# Patient Record
Sex: FEMALE | Race: WHITE | NOT HISPANIC OR LATINO | URBAN - METROPOLITAN AREA
[De-identification: names, ages, dates, MRNs, and addresses within clinical notes are randomized per-mention and may not be internally consistent; named-entity substitution may affect disease eponyms.]

---

## 2019-01-16 ENCOUNTER — OUTPATIENT (OUTPATIENT)
Dept: OUTPATIENT SERVICES | Facility: HOSPITAL | Age: 64
LOS: 1 days | End: 2019-01-16
Payer: COMMERCIAL

## 2019-01-16 ENCOUNTER — APPOINTMENT (OUTPATIENT)
Dept: MRI IMAGING | Facility: CLINIC | Age: 64
End: 2019-01-16
Payer: COMMERCIAL

## 2019-01-16 DIAGNOSIS — Z00.8 ENCOUNTER FOR OTHER GENERAL EXAMINATION: ICD-10-CM

## 2019-01-16 PROCEDURE — C8908: CPT

## 2019-01-16 PROCEDURE — 77049 MRI BREAST C-+ W/CAD BI: CPT | Mod: 26

## 2019-01-16 PROCEDURE — C8937: CPT

## 2021-05-07 ENCOUNTER — RESULT REVIEW (OUTPATIENT)
Age: 66
End: 2021-05-07

## 2022-10-14 ENCOUNTER — APPOINTMENT (OUTPATIENT)
Dept: PLASTIC SURGERY | Facility: CLINIC | Age: 67
End: 2022-10-14

## 2022-10-14 VITALS
SYSTOLIC BLOOD PRESSURE: 159 MMHG | HEIGHT: 60 IN | DIASTOLIC BLOOD PRESSURE: 84 MMHG | BODY MASS INDEX: 21.79 KG/M2 | HEART RATE: 54 BPM | WEIGHT: 111 LBS | TEMPERATURE: 98 F | OXYGEN SATURATION: 99 %

## 2022-10-14 PROCEDURE — 99213 OFFICE O/P EST LOW 20 MIN: CPT

## 2022-10-14 NOTE — REVIEW OF SYSTEMS
[Negative] : Psychiatric [de-identified] : hypothyroidism [de-identified] : hx of right breast cancer

## 2022-10-14 NOTE — HISTORY OF PRESENT ILLNESS
[FreeTextEntry1] : Patient is a 67 year old female here today for a routine follow up visit. \par She has a history of right breast cancer in 2015; she is s/p bilateral mastectomies, bilateral axillary sentinel lymph node biopsies with implant reconstruction. \par Pathology: Stage Ia ER positive/[place] positive/Hhf5wwj negative. \par Plastic Surgeon: Dr. Schmidt, to be seen on an as needed basis\par Med Onc: she saw Dr. Agustin Wing in the past; she now sees Dr. Osorio; she is on Letrozole, which she started in 2015. Her next appointment with Dr. Osorio is 12/2022. \par 1/28/2022 Bilateral breast MRI: s/p bilateral mastectomies with silicone implant reconstruction. Intact bilateral breast implants. No suspicious enhancing masses or areas of ductal enhancement. No evidence of malignancy. Bi-rads 2.\par She came alone.  \par Patient notes she experienced soreness and tenderness of her left axilla 2 weeks ago after receiving her most recent COVID booster. This symptom has since resolved.\par She denies any breast concerns on self breast examinations.

## 2022-10-14 NOTE — PHYSICAL EXAM
[Normocephalic] : normocephalic [Atraumatic] : atraumatic [Supple] : supple [No Supraclavicular Adenopathy] : no supraclavicular adenopathy [Examined in the supine and seated position] : examined in the supine and seated position [No dominant masses] : no dominant masses in right breast  [No dominant masses] : no dominant masses left breast [No Nipple Retraction] : no left nipple retraction [No Nipple Discharge] : no left nipple discharge [No Axillary Lymphadenopathy] : no left axillary lymphadenopathy [No Edema] : no edema [No Rashes] : no rashes [No Ulceration] : no ulceration [de-identified] : s/p bilateral mastectomies with implant reconstruction and nipple areolar reconstruction.

## 2022-10-14 NOTE — CONSULT LETTER
[Dear  ___] : Dear  [unfilled], [Consult Letter:] : I had the pleasure of evaluating your patient, [unfilled]. [Please see my note below.] : Please see my note below. [Sincerely,] : Sincerely, [FreeTextEntry3] : Susan M. Palleschi, MD, FACS\par Division of Breast Surgery\par Director, Breast Surgery\par [facility]\par [address] [place] [address]\par [address]\[place] [address]\par (Phone) [phone]\par (Fax) [phone]  [DrAntoinette  ___] : Dr. BARKLEY

## 2022-10-14 NOTE — ASSESSMENT
[FreeTextEntry1] : history of right breast cancer in 2015; she is s/p bilateral mastectomies, bilateral axillary sentinel lymph node biopsies with implant reconstruction. \par Clinical breast examination reveals no evidence of disease recurrence.\par \par 1. Screening breast MRI (implant evaluation) advised q 2-3 years, due 1/2024 (q 2 years per patient preference).\par 2. Follow up office visit due 4/2023. \par 3. Advised monthly self breast examinations and advised her to contact me if she has any concerns. \par 4. Follow up med onc Dr. Osorio.

## 2024-01-02 NOTE — HISTORY OF PRESENT ILLNESS
[FreeTextEntry1] : Patient is a 68 year old female here today for a routine follow up visit.  She has a history of right breast cancer in 2015; she is s/p bilateral mastectomies, bilateral axillary sentinel lymph node biopsies with implant reconstruction.  Pathology: Stage Ia ER positive/[place] positive/Gmz5dqa negative.  Plastic Surgeon: Dr. Schmidt, to be seen on an as needed basis  Med Onc: she saw Dr. Agustin Wing in the past; she now sees Dr. Osorio; she is on Letrozole, which she started in 2015. Her next appointment with Dr. Osorio is 12/2022.  1/28/2022 Bilateral breast MRI: s/p bilateral mastectomies with silicone implant reconstruction. Intact bilateral breast implants. No suspicious enhancing masses or areas of ductal enhancement. No evidence of malignancy. BI-RADS 2.

## 2024-01-23 ENCOUNTER — NON-APPOINTMENT (OUTPATIENT)
Age: 69
End: 2024-01-23

## 2024-01-24 ENCOUNTER — APPOINTMENT (OUTPATIENT)
Dept: ULTRASOUND IMAGING | Facility: CLINIC | Age: 69
End: 2024-01-24
Payer: COMMERCIAL

## 2024-01-24 ENCOUNTER — OUTPATIENT (OUTPATIENT)
Dept: OUTPATIENT SERVICES | Facility: HOSPITAL | Age: 69
LOS: 1 days | End: 2024-01-24
Payer: COMMERCIAL

## 2024-01-24 ENCOUNTER — RESULT REVIEW (OUTPATIENT)
Age: 69
End: 2024-01-24

## 2024-01-24 DIAGNOSIS — Z00.8 ENCOUNTER FOR OTHER GENERAL EXAMINATION: ICD-10-CM

## 2024-01-24 PROCEDURE — 76641 ULTRASOUND BREAST COMPLETE: CPT | Mod: 26,50

## 2024-01-24 PROCEDURE — 76641 ULTRASOUND BREAST COMPLETE: CPT

## 2024-01-29 ENCOUNTER — NON-APPOINTMENT (OUTPATIENT)
Age: 69
End: 2024-01-29

## 2024-02-28 ENCOUNTER — OUTPATIENT (OUTPATIENT)
Dept: OUTPATIENT SERVICES | Facility: HOSPITAL | Age: 69
LOS: 1 days | End: 2024-02-28
Payer: COMMERCIAL

## 2024-02-28 ENCOUNTER — APPOINTMENT (OUTPATIENT)
Dept: MRI IMAGING | Facility: CLINIC | Age: 69
End: 2024-02-28
Payer: COMMERCIAL

## 2024-02-28 DIAGNOSIS — Z85.3 PERSONAL HISTORY OF MALIGNANT NEOPLASM OF BREAST: ICD-10-CM

## 2024-02-28 PROCEDURE — 77049 MRI BREAST C-+ W/CAD BI: CPT | Mod: 26

## 2024-02-28 PROCEDURE — A9585: CPT

## 2024-02-28 PROCEDURE — C8908: CPT

## 2024-02-28 PROCEDURE — C8937: CPT

## 2024-02-29 ENCOUNTER — NON-APPOINTMENT (OUTPATIENT)
Age: 69
End: 2024-02-29

## 2024-04-05 ENCOUNTER — APPOINTMENT (OUTPATIENT)
Dept: PLASTIC SURGERY | Facility: CLINIC | Age: 69
End: 2024-04-05
Payer: COMMERCIAL

## 2024-04-05 VITALS
WEIGHT: 111 LBS | HEART RATE: 71 BPM | DIASTOLIC BLOOD PRESSURE: 79 MMHG | BODY MASS INDEX: 21.79 KG/M2 | SYSTOLIC BLOOD PRESSURE: 147 MMHG | OXYGEN SATURATION: 98 % | HEIGHT: 60 IN | RESPIRATION RATE: 16 BRPM

## 2024-04-05 DIAGNOSIS — Z85.3 PERSONAL HISTORY OF MALIGNANT NEOPLASM OF BREAST: ICD-10-CM

## 2024-04-05 PROCEDURE — 99213 OFFICE O/P EST LOW 20 MIN: CPT

## 2024-04-05 NOTE — ASSESSMENT
[FreeTextEntry1] : History of right breast cancer in 2015; she is s/p bilateral mastectomies, bilateral axillary sentinel lymph node biopsies with implant reconstruction.  Clinical breast examination reveals no evidence of disease recurrence.  1. Screening breast ultrasound (implant evaluation) due 2/2027 (q 2 years per patient preference). MRI denied by insurance initially. 2. Follow up office visit due in 1 year 3. Advised monthly self breast examinations and advised her to contact me if she has any concerns.  4. Follow up med onc Dr. Osorio.

## 2024-04-05 NOTE — PHYSICAL EXAM
[Normocephalic] : normocephalic [Atraumatic] : atraumatic [EOMI] : extra ocular movement intact [Sclera nonicteric] : sclera nonicteric [Supple] : supple [No Supraclavicular Adenopathy] : no supraclavicular adenopathy [No Cervical Adenopathy] : no cervical adenopathy [No Thyromegaly] : no thyromegaly [Examined in the supine and seated position] : examined in the supine and seated position [No dominant masses] : no dominant masses in right breast  [No dominant masses] : no dominant masses left breast [No Axillary Lymphadenopathy] : no left axillary lymphadenopathy [No Edema] : no edema [No Rashes] : no rashes [No Ulceration] : no ulceration [de-identified] : s/p bilateral mastectomies with implant reconstruction and nipple areolar reconstruction.

## 2024-04-05 NOTE — CONSULT LETTER
[Dear  ___] : Dear  [unfilled], [Courtesy Letter:] : I had the pleasure of seeing your patient, [unfilled], in my office today. [Please see my note below.] : Please see my note below. [Sincerely,] : Sincerely, [FreeTextEntry3] : Susan M. Palleschi, MD, FACS\par  Division of Breast Surgery\par  Director, Breast Surgery\par  [facility]\par  [address] [place] [address]\par  [address]\[place] [address]\par  (Phone) [phone]\par  (Fax) [phone]  [DrAntoinette  ___] : Dr. BARKLEY

## 2024-04-05 NOTE — HISTORY OF PRESENT ILLNESS
[FreeTextEntry1] : The patient is a 69 year old female here today for a routine follow up visit. She has a history of right breast cancer in 2015; she is s/p bilateral mastectomies, bilateral axillary sentinel lymph node biopsies with implant reconstruction. Pathology: Stage Ia ER positive/[place] positive/Mxo5dti negative. Plastic Surgeon: Dr. Schmidt, saw him within the last year Med Onc: she saw Dr. Agustin Wing in the past; she now sees Dr. Osorio; she completed 7 years of Letrozole last year. She saw her 12/2023 (now annually). She was diagnosed with osteoporosis. She will undergo dental work in the next few months and then will talk to Dr. Osorio about treatment. 1/24/2024 Bilateral Breast Ultrasound: ([facility]) Clinical Indication- 68 year old female presents for bilateral breast sonography. Patient is status post bilateral mastectomies with silicone implant reconstruction for right breast cancer in 2015. Sonography to evaluate bilateral breast implant. Right Reconstructed Breast- No suspicious solid mass.  Partially imaged breast implant. Left Reconstructed Breast- No suspicious solid mass. Partially imaged breast implant. Impression- No sonographic evidence of malignancy. Please note that bilateral breast implants are only partially visualized on this exam. If full evaluation of the breast silicone implants is needed to evaluate implant integrity, breast MRI should be considered. Recommendation- Clinical follow up. BI-RADS 2.  2/28/2024 Bilateral Breast MRI: ([facility]) Clinical Indication- 68 year old female with history of right breast cancer status post bilateral mastectomies with silicone implant reconstruction in September 2015, now presents for breast MRI for further evaluation of the implants. Findings- The reconstructed breast parenchyma is almost entirely fatty.   The reconstructed breasts demonstrate minimal background parenchymal enhancement. Right Reconstructed Breast- There are scattered enhancing nonspecific foci.  There is no suspicious enhancement in the reconstructed right breast. The retropectoral silicone implant is intact. No periimplant fluid collection. Left Reconstructed Breast- There are scattered enhancing nonspecific foci.  There is no suspicious enhancement in the reconstructed left breast. The retropectoral silicone implant is intact. No periimplant fluid collection. Axilla/other- There is no significant axillary or internal mammary lymphadenopathy. Impression- No MRI evidence of malignancy. Intact bilateral retropectoral silicone implants. Recommendation- Clinical follow up. BI-RADS 2.  She denies any current breast concerns

## 2025-09-09 ENCOUNTER — NON-APPOINTMENT (OUTPATIENT)
Age: 70
End: 2025-09-09

## 2025-09-10 ENCOUNTER — APPOINTMENT (OUTPATIENT)
Dept: PLASTIC SURGERY | Facility: CLINIC | Age: 70
End: 2025-09-10
Payer: COMMERCIAL

## 2025-09-10 VITALS
BODY MASS INDEX: 20.62 KG/M2 | HEART RATE: 57 BPM | SYSTOLIC BLOOD PRESSURE: 164 MMHG | TEMPERATURE: 97.6 F | HEIGHT: 60 IN | DIASTOLIC BLOOD PRESSURE: 80 MMHG | OXYGEN SATURATION: 98 % | RESPIRATION RATE: 16 BRPM | WEIGHT: 105 LBS

## 2025-09-10 DIAGNOSIS — N63.0 UNSPECIFIED LUMP IN UNSPECIFIED BREAST: ICD-10-CM

## 2025-09-10 DIAGNOSIS — Z85.3 PERSONAL HISTORY OF MALIGNANT NEOPLASM OF BREAST: ICD-10-CM

## 2025-09-10 PROCEDURE — 99214 OFFICE O/P EST MOD 30 MIN: CPT

## 2025-09-18 ENCOUNTER — APPOINTMENT (OUTPATIENT)
Dept: ULTRASOUND IMAGING | Facility: CLINIC | Age: 70
End: 2025-09-18
Payer: COMMERCIAL

## 2025-09-18 ENCOUNTER — RESULT REVIEW (OUTPATIENT)
Age: 70
End: 2025-09-18

## 2025-09-18 PROCEDURE — 76642 ULTRASOUND BREAST LIMITED: CPT | Mod: LT

## 2025-09-19 ENCOUNTER — NON-APPOINTMENT (OUTPATIENT)
Age: 70
End: 2025-09-19

## 2025-09-19 DIAGNOSIS — Z98.82 BREAST IMPLANT STATUS: ICD-10-CM
